# Patient Record
Sex: MALE | Race: WHITE | NOT HISPANIC OR LATINO | Employment: OTHER | URBAN - METROPOLITAN AREA
[De-identification: names, ages, dates, MRNs, and addresses within clinical notes are randomized per-mention and may not be internally consistent; named-entity substitution may affect disease eponyms.]

---

## 2025-05-30 ENCOUNTER — TELEPHONE (OUTPATIENT)
Dept: FAMILY MEDICINE CLINIC | Facility: CLINIC | Age: OVER 89
End: 2025-05-30

## 2025-05-30 NOTE — LETTER
[facility]  [address], [address]  [place] [address]  [phone]    Date: 05/30/25    Steven Chou  [address] [address]    Dear Steven:    Your primary care provider, Teena Sandoval MD, [facility], is committed to providing you with quality health care and we consider it a privilege to be your health care provider.  We have not seen you in the office and have been attempting to contact you to schedule your annual physical.  Your primary care provider wants to ensure your ongoing medical care is being addressed.  Please call the office [phone] to re-establish care and schedule your annual physical today.  Yearly physicals are a key component in maintaining good health.  If you have established care with a different primary care provider, please call our office to notify us of this change.  We encourage you to call the number on the back of your insurance card to change your primary care physician with your insurance company as well.   We thank you for choosing [facility] for your healthcare needs.  Sincerely,  Flori Partida  [facility]  [phone]

## 2025-05-30 NOTE — TELEPHONE ENCOUNTER
Please remove pt from our pt panel. I called pt to confirm if will establish care at our practice because we listed as a PCP but pt has never been seen and number on file is not in service. Attribution letter was sent today.     Thank you